# Patient Record
Sex: MALE | Race: WHITE | Employment: STUDENT | URBAN - METROPOLITAN AREA
[De-identification: names, ages, dates, MRNs, and addresses within clinical notes are randomized per-mention and may not be internally consistent; named-entity substitution may affect disease eponyms.]

---
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
## 2023-03-16 ENCOUNTER — OFFICE VISIT (OUTPATIENT)
Dept: URGENT CARE | Age: 23
End: 2023-03-16
Payer: COMMERCIAL

## 2023-03-16 VITALS
HEART RATE: 72 BPM | DIASTOLIC BLOOD PRESSURE: 62 MMHG | RESPIRATION RATE: 18 BRPM | WEIGHT: 171.9 LBS | OXYGEN SATURATION: 98 % | TEMPERATURE: 98.6 F | SYSTOLIC BLOOD PRESSURE: 121 MMHG

## 2023-03-16 DIAGNOSIS — R07.9 CHEST PAIN, UNSPECIFIED TYPE: Primary | ICD-10-CM

## 2023-03-16 PROCEDURE — 99203 OFFICE O/P NEW LOW 30 MIN: CPT | Performed by: FAMILY MEDICINE

## 2023-03-16 PROCEDURE — 93000 ELECTROCARDIOGRAM COMPLETE: CPT | Performed by: FAMILY MEDICINE

## 2023-03-16 NOTE — PROGRESS NOTES
HISTORY OF PRESENT ILLNESS  Von Billy is a 23 y.o. male. Episode of chest pain 2 nights ago that lasted about 30 mins to 1 hr.  Substernal did not radiate. Noticed his right hand turned blue, nu numbnes. Felt like heart was beating fast. This resolved when he went to sleep. No similar episodes previously. Has had issues with acid reflux and esophagus issues. Seen by urgent care about a month ago for esophageal spasm, given a GI cocktail that helped. No SOB. No congenital heart disease, no family heart disease. Does not hurt when he pushes on chest.   Feels a very mild sensation that heart is being squeezed that comes and goes, not present currently. Review of Systems   Constitutional:  Negative for fever. Respiratory:  Negative for shortness of breath. Cardiovascular:  Negative for chest pain, palpitations and leg swelling. Physical Exam  Constitutional:       Appearance: Normal appearance. He is normal weight. Cardiovascular:      Rate and Rhythm: Normal rate and regular rhythm. Pulses: Normal pulses. Heart sounds: Normal heart sounds. Pulmonary:      Effort: Pulmonary effort is normal.      Breath sounds: Normal breath sounds. Abdominal:      General: Abdomen is flat. Palpations: Abdomen is soft. Tenderness: There is no abdominal tenderness. Musculoskeletal:      Right lower leg: No edema. Left lower leg: No edema. Comments: No tenderness to palpation of the chest   Skin:     Capillary Refill: Capillary refill takes less than 2 seconds. Neurological:      Mental Status: He is alert. ASSESSMENT and PLAN  Diagnoses and all orders for this visit:    1. Chest pain, unspecified type: low risk for cardiac ischemia. Differential includes esophageal spasm, acid reflux, structural heart abnormalities, arrhythmia. Not reproducible with palpation so costochondritis unlikely.  No ST elevation to suggest pericarditis or viral illness to suggest myocarditis. H/o asthma but no wheezing or SOB. -     EKG, 12 LEAD, INITIAL;  Future - NSR at 63 bmp, no signs of ischemia, regular intervals, normal axis  -     REFERRAL TO CARDIOLOGY - for holter monitor and possibly echo  - offered PPI for acid reflux/esophageal spasm but patient would like to hold off